# Patient Record
Sex: MALE | Race: BLACK OR AFRICAN AMERICAN | NOT HISPANIC OR LATINO | Employment: UNEMPLOYED | URBAN - METROPOLITAN AREA
[De-identification: names, ages, dates, MRNs, and addresses within clinical notes are randomized per-mention and may not be internally consistent; named-entity substitution may affect disease eponyms.]

---

## 2024-06-16 ENCOUNTER — HOSPITAL ENCOUNTER (EMERGENCY)
Facility: HOSPITAL | Age: 54
Discharge: HOME | End: 2024-06-16
Attending: INTERNAL MEDICINE

## 2024-06-16 VITALS
BODY MASS INDEX: 31.64 KG/M2 | RESPIRATION RATE: 16 BRPM | WEIGHT: 226 LBS | TEMPERATURE: 98 F | HEIGHT: 71 IN | DIASTOLIC BLOOD PRESSURE: 91 MMHG | SYSTOLIC BLOOD PRESSURE: 149 MMHG | OXYGEN SATURATION: 100 % | HEART RATE: 87 BPM

## 2024-06-16 DIAGNOSIS — I10 HYPERTENSION, UNSPECIFIED TYPE: Primary | ICD-10-CM

## 2024-06-16 DIAGNOSIS — R42 LIGHTHEADED: ICD-10-CM

## 2024-06-16 LAB
BILIRUBIN, POC UA: NEGATIVE
BLOOD, POC UA: ABNORMAL
CLARITY, POC UA: ABNORMAL
COLOR, POC UA: ABNORMAL
GLUCOSE, POC UA: NEGATIVE
KETONES, POC UA: ABNORMAL
LEUKOCYTE EST, POC UA: NEGATIVE
NITRITE, POC UA: NEGATIVE
PH UR STRIP: 6.5 [PH]
POCT GLUCOSE: 88 MG/DL (ref 70–110)
PROTEIN, POC UA: NEGATIVE
SPECIFIC GRAVITY, POC UA: 1.02
UROBILINOGEN, POC UA: 2 E.U./DL

## 2024-06-16 PROCEDURE — 82962 GLUCOSE BLOOD TEST: CPT | Mod: ER

## 2024-06-16 PROCEDURE — 93005 ELECTROCARDIOGRAM TRACING: CPT | Mod: ER

## 2024-06-16 PROCEDURE — 25000003 PHARM REV CODE 250: Mod: ER | Performed by: PHYSICIAN ASSISTANT

## 2024-06-16 PROCEDURE — 99283 EMERGENCY DEPT VISIT LOW MDM: CPT | Mod: 25,ER

## 2024-06-16 PROCEDURE — 81003 URINALYSIS AUTO W/O SCOPE: CPT | Mod: ER

## 2024-06-16 RX ORDER — LISINOPRIL AND HYDROCHLOROTHIAZIDE 12.5; 2 MG/1; MG/1
1 TABLET ORAL DAILY
COMMUNITY
End: 2024-06-16

## 2024-06-16 RX ORDER — LISINOPRIL AND HYDROCHLOROTHIAZIDE 12.5; 2 MG/1; MG/1
1 TABLET ORAL DAILY
Qty: 30 TABLET | Refills: 1 | Status: SHIPPED | OUTPATIENT
Start: 2024-06-16 | End: 2024-07-16

## 2024-06-16 RX ORDER — LISINOPRIL 20 MG/1
20 TABLET ORAL
Status: COMPLETED | OUTPATIENT
Start: 2024-06-16 | End: 2024-06-16

## 2024-06-16 RX ORDER — HYDROCHLOROTHIAZIDE 12.5 MG/1
12.5 TABLET ORAL
Status: COMPLETED | OUTPATIENT
Start: 2024-06-16 | End: 2024-06-16

## 2024-06-16 RX ADMIN — LISINOPRIL 20 MG: 20 TABLET ORAL at 06:06

## 2024-06-16 RX ADMIN — HYDROCHLOROTHIAZIDE 12.5 MG: 12.5 TABLET ORAL at 06:06

## 2024-06-16 NOTE — Clinical Note
"Julian Deleonmagdalena Farnsworth was seen and treated in our emergency department on 6/16/2024.  He may return to work on 06/17/2024.       If you have any questions or concerns, please don't hesitate to call.      Yanely Montero PA-C"

## 2024-06-16 NOTE — DISCHARGE INSTRUCTIONS

## 2024-06-16 NOTE — ED NOTES
Felt  weak  yesterday and light headed and  was  unable  to go  to work- reports   feeling  light  headed when  walking  outside

## 2024-06-16 NOTE — Clinical Note
"Julian Deleonmagdalena Farnsworth was seen and treated in our emergency department on 6/16/2024.  He may return to work on 06/18/2024.       If you have any questions or concerns, please don't hesitate to call.      Yanely Montero PA-C"

## 2024-06-17 NOTE — ED PROVIDER NOTES
Encounter Date: 6/16/2024    SCRIBE #1 NOTE: I, Karthik Issa, am scribing for, and in the presence of,  Yanely Montero MD. I have scribed the following portions of the note - Other sections scribed: HPI, ROS.       History     Chief Complaint   Patient presents with    Hypertension      Feeling stressed and  when ambulating  feels    light headed -- denies  dizziness  while  at  rest - -    out of  blood pressure  medication-   denies  syncope-     CC: Hypertensive episode     HPI: 53 yo M with PHMx of HTN, PONV, presents for evaluation of a hypertensive episode beginning PTA. Patient reports associated lightheadedness when ambulating or standing and intermittent blurred vision. Patient notes he is no longer complaint with Rx lisinopril-hctz 20-12.5 because he ran out 3 days ago. He reports his PCP is located in [place], [place], and he is in the area for work. No other exacerbating or alleviating factors.  Denies syncope, dizziness, headaches, numbness, paresthesias, confusion, gait problem, speech difficulty, appetite change, abdominal pain,chest pain, dyspnea, dysuria, urinary frequency, urinary urgency, abnormal bowel movements, nausea, emesis, diarrhea, chills, rash, or any other associated symptoms. Patient additionally reports associated symptoms have revolved.          The history is provided by the patient. No  was used.     Review of patient's allergies indicates:  No Known Allergies  Past Medical History:   Diagnosis Date    Arthritis     right knee    Hiccups     after steroid injections    Hypertension     stopped meds, insurance would not cover    PONV (postoperative nausea and vomiting)     Sinus headache     Smoker      Past Surgical History:   Procedure Laterality Date    FRACTURE SURGERY      zygomatic arch,left side    KNEE ARTHROSCOPY  01/04/2013    right     Family History   Problem Relation Name Age of Onset    Anesthesia problems Neg Hx      Clotting disorder Neg Hx       Diabetes Mother      Hypertension Mother      Early death Father          Gunshot wound     Social History     Tobacco Use    Smoking status: Every Day     Current packs/day: 0.50     Average packs/day: 0.5 packs/day for 1 year (0.5 ttl pk-yrs)     Types: Cigarettes   Substance Use Topics    Alcohol use: Yes     Comment: occasional    Drug use: No     Review of Systems   Constitutional:  Negative for appetite change, chills and fever.   HENT:  Negative for congestion, ear pain, rhinorrhea and sore throat.    Eyes:  Positive for visual disturbance (intermittent blurred vision). Negative for redness.   Respiratory:  Negative for shortness of breath and stridor.    Cardiovascular:  Negative for chest pain.   Gastrointestinal:  Negative for abdominal pain, constipation, diarrhea, nausea and vomiting.        (-) abnormal bowel movements    Genitourinary:  Negative for dysuria, frequency, hematuria and urgency.   Musculoskeletal:  Negative for back pain, gait problem and neck pain.   Skin:  Negative for rash.   Neurological:  Positive for light-headedness. Negative for dizziness, syncope, speech difficulty, weakness, numbness and headaches.        (-) paresthesias    Hematological:  Does not bruise/bleed easily.   Psychiatric/Behavioral:  Negative for confusion.        Physical Exam     Initial Vitals [06/16/24 1740]   BP Pulse Resp Temp SpO2   (!) 141/88 90 16 98.1 °F (36.7 °C) 99 %      MAP       --         Physical Exam    Nursing note and vitals reviewed.  Constitutional: He appears well-developed and well-nourished. No distress.   HENT:   Head: Normocephalic.   Right Ear: External ear normal.   Left Ear: External ear normal.   Eyes: Conjunctivae are normal.   Cardiovascular:  Normal rate and regular rhythm.     Exam reveals no gallop and no friction rub.       No murmur heard.  Pulmonary/Chest: Breath sounds normal. No respiratory distress. He has no wheezes. He has no rhonchi. He has no rales.   Musculoskeletal:          General: Normal range of motion.     Neurological: He is alert. He has normal strength. No cranial nerve deficit or sensory deficit. Coordination and gait normal.   Skin: Skin is warm and dry. No rash noted.   Psychiatric: He has a normal mood and affect. His behavior is normal. Judgment and thought content normal.         ED Course   Procedures  Labs Reviewed   POCT URINALYSIS W/O SCOPE - Abnormal; Notable for the following components:       Result Value    Ketones, UA Trace (*)     Blood, UA Trace-intact (*)     Urobilinogen, UA 2.0 (*)     All other components within normal limits   POCT URINALYSIS W/O SCOPE   POCT GLUCOSE MONITORING CONTINUOUS   POCT GLUCOSE          Imaging Results    None          Medications   lisinopriL tablet 20 mg (20 mg Oral Given 6/16/24 1852)   hydroCHLOROthiazide tablet 12.5 mg (12.5 mg Oral Given 6/16/24 1852)     Medical Decision Making  55 y/o M presenting for evaluation of lightheadedness intermittently for 3 days. Pt states he typically feels this way when he is out of his hypertension medications.  He ran out of his medications 3 days ago.  He denies any chest pain shortness breath dizziness, syncope, weakness, paresthesias, confusion.  Exam above.  No focal neurologic deficits.  Considered but doubt acute intracranial process Glucose normal.  Doubt hypo or hyperglycemia as etiology of his lightheadedness.  UA with trace ketones.  No evidence of UTI.  He was not hypotensive or tachycardic.  Orthostatic vitals unremarkable.  He is asymptomatic currently.  He states that he feels that his symptoms are due to being out of his medications.  Will have him increase oral hydration.  Will discharge with refill of his medications.  Will have him follow up with primary care and return emergency department worsening symptoms or as needed.    Amount and/or Complexity of Data Reviewed  Labs: ordered.    Risk  Prescription drug management.            Scribe Attestation:   Scribe #1: I  performed the above scribed service and the documentation accurately describes the services I performed. I attest to the accuracy of the note.                             I, Yanely Montero PA-C , personally performed the services described in this documentation. All medical record entries made by the scribe were at my direction and in my presence. I have reviewed the chart and agree that the record reflects my personal performance and is accurate and complete.     Clinical Impression:  Final diagnoses:  [R42] Lightheaded  [I10] Hypertension, unspecified type (Primary)          ED Disposition Condition    Discharge Stable          ED Prescriptions       Medication Sig Dispense Start Date End Date Auth. Provider    lisinopriL-hydrochlorothiazide (PRINZIDE,ZESTORETIC) 20-12.5 mg per tablet Take 1 tablet by mouth once daily. 30 tablet 6/16/2024 7/16/2024 Yanely Montero PA-C          Follow-up Information       Follow up With Specialties Details Why Contact Info    [facility] -  Schedule an appointment as soon as possible for a visit in 2 days for follow up [address] [place] [address]  [phone]      [facility] ED Emergency Medicine Go to  As needed, If symptoms worsen [address] [facility] [address] [phone]             Yanely Montero PA-C  06/17/24 5607

## 2024-06-19 LAB
OHS QRS DURATION: 90 MS
OHS QTC CALCULATION: 408 MS

## 2024-07-04 ENCOUNTER — HOSPITAL ENCOUNTER (EMERGENCY)
Facility: HOSPITAL | Age: 54
Discharge: HOME | End: 2024-07-04
Attending: EMERGENCY MEDICINE

## 2024-07-04 VITALS
TEMPERATURE: 98 F | RESPIRATION RATE: 16 BRPM | BODY MASS INDEX: 34.44 KG/M2 | DIASTOLIC BLOOD PRESSURE: 79 MMHG | WEIGHT: 246 LBS | OXYGEN SATURATION: 97 % | HEIGHT: 71 IN | HEART RATE: 84 BPM | SYSTOLIC BLOOD PRESSURE: 123 MMHG

## 2024-07-04 DIAGNOSIS — L24.81 IRRITANT CONTACT DERMATITIS DUE TO METALS: Primary | ICD-10-CM

## 2024-07-04 DIAGNOSIS — L74.0 HEAT RASH: ICD-10-CM

## 2024-07-04 PROCEDURE — 99283 EMERGENCY DEPT VISIT LOW MDM: CPT | Mod: ER

## 2024-07-04 RX ORDER — HYDROCORTISONE 25 MG/G
CREAM TOPICAL 2 TIMES DAILY
Qty: 28 G | Refills: 0 | Status: SHIPPED | OUTPATIENT
Start: 2024-07-04

## 2024-07-04 NOTE — Clinical Note
"Julian Deleonmagdalena Farnsworth was seen and treated in our emergency department on 7/4/2024.  He may return to work on 07/05/2024.       If you have any questions or concerns, please don't hesitate to call.      Viktor Maya PA-C"

## 2024-07-04 NOTE — ED PROVIDER NOTES
"Encounter Date: 7/4/2024    SCRIBE #1 NOTE: I, Nunasreen Hoffmann, am scribing for, and in the presence of,  Viktor Maya PA-C.       History     Chief Complaint   Patient presents with    Rash     Rash  to chest  x 2-3 days   aggravated  by  heat and sweat -  needs  work  note     Julian Farnsworth is a 54 y.o. male, with a PMHx of arthritis, HTN, who presents to the ED with an itchy rash to his chest wall that he noticed yesterday. Patient reports rash is exacerbated with sweat. He also reports a wound to his right posterior lower leg from getting tangled in his neighbor's dog leashes 2 days ago. He reports a lot of dust in the air at work, stating he gets "gunk" in the inner corners of his eyes often. No other exacerbating or alleviating factors. Denies fever, CP, SOB, or other associated symptoms.       The history is provided by the patient. No  was used.     Review of patient's allergies indicates:  No Known Allergies  Past Medical History:   Diagnosis Date    Arthritis     right knee    Hiccups     after steroid injections    Hypertension     stopped meds, insurance would not cover    PONV (postoperative nausea and vomiting)     Sinus headache     Smoker      Past Surgical History:   Procedure Laterality Date    FRACTURE SURGERY      zygomatic arch,left side    KNEE ARTHROSCOPY  01/04/2013    right     Family History   Problem Relation Name Age of Onset    Anesthesia problems Neg Hx      Clotting disorder Neg Hx      Diabetes Mother      Hypertension Mother      Early death Father          Gunshot wound     Social History     Tobacco Use    Smoking status: Every Day     Current packs/day: 0.50     Average packs/day: 0.5 packs/day for 1 year (0.5 ttl pk-yrs)     Types: Cigarettes   Substance Use Topics    Alcohol use: Yes     Comment: occasional    Drug use: No     Review of Systems   Constitutional:  Negative for chills, diaphoresis, fatigue and fever.   HENT:  Negative for congestion, ear " discharge, ear pain, rhinorrhea, sore throat and trouble swallowing.    Eyes:  Negative for photophobia, redness and visual disturbance.   Respiratory:  Negative for cough and shortness of breath.    Cardiovascular:  Negative for chest pain, palpitations and leg swelling.   Gastrointestinal:  Negative for abdominal pain, diarrhea, nausea and vomiting.   Genitourinary:  Negative for difficulty urinating, dysuria, flank pain, frequency and hematuria.   Musculoskeletal:  Negative for arthralgias, back pain, myalgias, neck pain and neck stiffness.   Skin:  Positive for rash (chest) and wound. Negative for pallor.   Neurological:  Negative for dizziness, weakness, light-headedness, numbness and headaches.   Hematological:  Does not bruise/bleed easily.       Physical Exam     Initial Vitals [07/04/24 1653]   BP Pulse Resp Temp SpO2   123/79 84 16 98.2 °F (36.8 °C) 97 %      MAP       --         Physical Exam    Nursing note and vitals reviewed.  Constitutional: He appears well-developed and well-nourished. He is not diaphoretic. He does not appear ill. No distress.   HENT:   Head: Normocephalic and atraumatic.   Right Ear: External ear normal.   Left Ear: External ear normal.   Nose: Nose normal.   Mouth/Throat: Uvula is midline and oropharynx is clear and moist.   Eyes: Conjunctivae, EOM and lids are normal. Pupils are equal, round, and reactive to light. Right eye exhibits no discharge. Left eye exhibits no discharge. No scleral icterus.   Neck: Trachea normal. Neck supple.   Normal range of motion.   Full passive range of motion without pain.     Cardiovascular:  Normal rate, regular rhythm, normal heart sounds, intact distal pulses and normal pulses.     Exam reveals no distant heart sounds and no friction rub.       Pulmonary/Chest: Effort normal and breath sounds normal. No respiratory distress.   Abdominal: Abdomen is soft. Bowel sounds are normal. He exhibits no distension and no pulsatile midline mass. There is  no abdominal tenderness.   No right CVA tenderness.  No left CVA tenderness. There is no rebound and no guarding.   Musculoskeletal:         General: Normal range of motion.      Right shoulder: Normal.      Left shoulder: Normal.      Right elbow: Normal.      Left elbow: Normal.      Right wrist: Normal.      Left wrist: Normal.      Right hand: Normal.      Left hand: Normal.      Cervical back: Normal, full passive range of motion without pain, normal range of motion and neck supple.      Thoracic back: Normal.      Lumbar back: Normal.      Right hip: Normal.      Left hip: Normal.      Right knee: Normal.      Left knee: Normal.      Right lower leg: Normal.      Left lower leg: Normal.      Right ankle: Normal.      Left ankle: Normal.      Right foot: Normal.      Left foot: Normal.     Neurological: He is alert and oriented to person, place, and time. He has normal strength. No cranial nerve deficit or sensory deficit. Coordination and gait normal.   Skin: Skin is warm and dry. Capillary refill takes less than 2 seconds. Rash noted. No bruising and no ecchymosis noted. Rash is maculopapular. No erythema.   Maculopapular scaling rash around neck. Non-tender.  Negative Nikolsky    Approx. 8cm healing abrasion to the right LE with scab formation, no induration, no fluctuance, no drainage. Healing well.   Psychiatric: He has a normal mood and affect. His speech is normal and behavior is normal. Thought content normal.         ED Course   Procedures  Labs Reviewed - No data to display       Imaging Results    None          Medications - No data to display  Medical Decision Making  Julian Farnsworth is a 54 y.o. male, with a PMHx of arthritis, HTN, who presents to the ED with an itchy rash to his chest wall that he noticed yesterday. Patient reports rash is exacerbated with sweat. He also reports a wound to his right posterior lower leg from getting tangled in his neighbor's dog leashes 2 days ago. He reports a  "lot of dust in the air at work, stating he gets "gunk" in the inner corners of his eyes often. No other exacerbating or alleviating factors. Denies fever, CP, SOB, or other associated symptoms.       Patient's chart and medical history reviewed.  Patient's vitals reviewed.  They are afebrile, no respiratory distress, nontoxic-appearing in the ED.  Differential diagnosis considered anaphylaxis, Urticaria, Contact dermatitis, Chicken pox, Hand foot mouth disease, Herpes zoster, Impetigo, Pemphigus vulgaris, Bullous pemphigoid, SJS/TEN, cellulitis, erysipelas, mononucleosis, scarlet fever, drug-induced rash, other rash.   Physical exam as noted above. Patient rash is in the same position as the metal necklace he is wearing today. States he wears the necklace at work and sweats a lot a work. Likely irritant dermatitis 2/2 to metal necklace. Advised to not wear necklace at work when sweating. Will dc with cortisone cream 2.5%.     At this time I'll discharge home to follow up with primary care physician in the next 1-2 days for further evaluation.  If the pain continues the pt will need to see dermatology for further evaluation.  The patient is comfortable with this plan and comfortable going home at this time. After taking into careful account the historical factors and physical exam findings of the patient's presentation today, in conjunction with the empirical and objective data obtained on ED workup, no acute emergent medical condition has been identified. The patient appears to be low risk for an emergent medical condition and I feel it is safe and appropriate at this time for the patient to be discharged to follow-up as detailed in their discharge instructions for reevaluation and possible continued outpatient workup and management. I have discussed the specifics of the workup with the patient and the patient has verbalized understanding of the details of the workup, the diagnosis, the treatment plan, and the need " for outpatient follow-up.  Although the patient has no emergent etiology today this does not preclude the development of an emergent condition so in addition, I have advised the patient that they can return to the ED and/or activate EMS at any time with worsening of their symptoms, change of their symptoms, or with any other medical complaint.  The patient remained comfortable and stable during their visit in the ED.  Discharge and follow-up instructions discussed with the patient who expressed understanding and willingness to comply with my recommendations. I discussed with the patient/family the diagnosis, treatment plan, indications for return to the emergency department, and for expected follow-up. Please follow up with your primary doctor in 1-2 days and return to the ED in any new, worsening, or continued symptoms. The patient/family verbalized an understanding. The patient/family is asked if there are any questions or concerns. We discuss the case, until all issues are addressed to the patient/family's satisfaction. Patient/family understands and is agreeable to the plan.      VIKTOR ANGELO PA-C.    DISCLAIMER: This note was prepared with [employer] voice recognition transcription software. Garbled syntax, mangled pronouns, and other bizarre constructions may be attributed to that software system.      Amount and/or Complexity of Data Reviewed  External Data Reviewed: notes.    Risk  Prescription drug management.            Scribe Attestation:   Scribe #1: I performed the above scribed service and the documentation accurately describes the services I performed. I attest to the accuracy of the note.                             I, Viktor Angelo PA-C, personally performed the services described in this documentation.  All medical record entries made by the scribe were at my direction and in my presence.  I have reviewed the chart and agree that the record reflects my personal performance and is accurate and  complete.    Clinical Impression:  Final diagnoses:  [L74.0] Heat rash  [L24.81] Irritant contact dermatitis due to metals (Primary)          ED Disposition Condition    Discharge Stable          ED Prescriptions       Medication Sig Dispense Start Date End Date Auth. Provider    hydrocortisone 2.5 % cream Apply topically 2 (two) times daily. 28 g 7/4/2024 -- Viktor Maya PA-C          Follow-up Information       Follow up With Specialties Details Why Contact Info    [facility] -  Schedule an appointment as soon as possible for a visit in 1 day for follow up if you do not currently have a  [address]  [place] [address]  [phone]      Your primary care physician  Schedule an appointment as soon as possible for a visit in 1 day for follow up              Viktor Maya PA-C  07/04/24 1730

## 2024-09-27 ENCOUNTER — HOSPITAL ENCOUNTER (EMERGENCY)
Facility: HOSPITAL | Age: 54
Discharge: HOME | End: 2024-09-27
Attending: EMERGENCY MEDICINE
Payer: COMMERCIAL

## 2024-09-27 VITALS
BODY MASS INDEX: 34.88 KG/M2 | HEIGHT: 71 IN | SYSTOLIC BLOOD PRESSURE: 144 MMHG | OXYGEN SATURATION: 99 % | DIASTOLIC BLOOD PRESSURE: 90 MMHG | TEMPERATURE: 99 F | RESPIRATION RATE: 18 BRPM | HEART RATE: 87 BPM | WEIGHT: 249.13 LBS

## 2024-09-27 DIAGNOSIS — M79.672 LEFT FOOT PAIN: ICD-10-CM

## 2024-09-27 DIAGNOSIS — L84 CORN OF FOOT: Primary | ICD-10-CM

## 2024-09-27 DIAGNOSIS — J06.9 VIRAL URI WITH COUGH: ICD-10-CM

## 2024-09-27 LAB
CTP QC/QA: YES
SARS-COV-2 RDRP RESP QL NAA+PROBE: NEGATIVE

## 2024-09-27 PROCEDURE — 87635 SARS-COV-2 COVID-19 AMP PRB: CPT | Mod: ER | Performed by: EMERGENCY MEDICINE

## 2024-09-27 PROCEDURE — 99284 EMERGENCY DEPT VISIT MOD MDM: CPT | Mod: 25,ER

## 2024-09-27 RX ORDER — METHYLPREDNISOLONE 4 MG/1
TABLET ORAL
Qty: 21 EACH | Refills: 0 | Status: SHIPPED | OUTPATIENT
Start: 2024-09-27 | End: 2024-10-18

## 2024-09-27 RX ORDER — GUAIFENESIN AND DEXTROMETHORPHAN HYDROBROMIDE 1200; 60 MG/1; MG/1
1 TABLET, EXTENDED RELEASE ORAL 2 TIMES DAILY PRN
Qty: 14 TABLET | Refills: 0 | Status: SHIPPED | OUTPATIENT
Start: 2024-09-27 | End: 2024-10-04

## 2024-09-27 NOTE — DISCHARGE INSTRUCTIONS
Obtain a over-the-counter corn and callus removal kit.  Follow up with Podiatry.  Recommending obtaining new work boots or placing a new insole into the boot.

## 2024-09-27 NOTE — ED PROVIDER NOTES
Encounter Date: 9/27/2024       History     Chief Complaint   Patient presents with    URI     Patient presents w/ a c/o of URI sx (nasal congestion) since Saturday. Denies any sore throat. Denies any fever.    Foot Pain     Left lateral foot pain for a week. No injury or trauma.     54-year-old male with history of hypertension on lisinopril and hydrochlorothiazide presents complaining of 3 days of nasal congestion and cough fever.  No sore throat.  No ear pain.  No nausea vomiting or diarrhea.  Secondary complaint is several weeks of left foot pain.  No known trauma or injury      Review of patient's allergies indicates:  No Known Allergies  Past Medical History:   Diagnosis Date    Arthritis     right knee    Hiccups     after steroid injections    Hypertension     stopped meds, insurance would not cover    PONV (postoperative nausea and vomiting)     Sinus headache     Smoker      Past Surgical History:   Procedure Laterality Date    FRACTURE SURGERY      zygomatic arch,left side    KNEE ARTHROSCOPY  01/04/2013    right     Family History   Problem Relation Name Age of Onset    Anesthesia problems Neg Hx      Clotting disorder Neg Hx      Diabetes Mother      Hypertension Mother      Early death Father          Gunshot wound     Social History     Tobacco Use    Smoking status: Every Day     Current packs/day: 0.50     Average packs/day: 0.5 packs/day for 1 year (0.5 ttl pk-yrs)     Types: Cigarettes   Substance Use Topics    Alcohol use: Yes     Comment: occasional    Drug use: No     Review of Systems    Physical Exam     Initial Vitals [09/27/24 1059]   BP Pulse Resp Temp SpO2   (!) 144/90 87 18 98.6 °F (37 °C) 99 %      MAP       --         Physical Exam    Nursing note and vitals reviewed.  Constitutional: He appears well-developed and well-nourished.   HENT:   Head: Atraumatic.   Mouth/Throat: Oropharynx is clear and moist.   Eyes: EOM are normal. Pupils are equal, round, and reactive to light.   Neck:  Neck supple. No JVD present.   Normal range of motion.  Cardiovascular:  Normal rate, regular rhythm, normal heart sounds and intact distal pulses.     Exam reveals no gallop and no friction rub.       No murmur heard.  Pulmonary/Chest: Breath sounds normal.   Abdominal: Abdomen is soft. Bowel sounds are normal. There is no abdominal tenderness.   Musculoskeletal:         General: Normal range of motion.      Cervical back: Normal range of motion and neck supple.     Lymphadenopathy:     He has no cervical adenopathy.   Neurological: He is alert and oriented to person, place, and time. He has normal strength.   Skin: Skin is warm and dry.   Patient was developing a corner callus with the area of pain no evidence of infection.  No evidence of foreign body.  Patient was not recall puncture wound.   Psychiatric: He has a normal mood and affect. Thought content normal.         ED Course   Procedures  Labs Reviewed   SARS-COV-2 RDRP GENE       Result Value    POC Rapid COVID Negative       Acceptable Yes            Imaging Results              X-Ray Foot Complete Left (Final result)  Result time 09/27/24 12:13:20      Final result by Tito Gomez MD (09/27/24 12:13:20)                   Impression:      No convincing acute displaced fracture.  Mild soft tissue edema.      Electronically signed by: Tito Gomez MD  Date:    09/27/2024  Time:    12:13               Narrative:    EXAMINATION:  XR FOOT COMPLETE 3 VIEW LEFT    CLINICAL HISTORY:  Pain in left foot    TECHNIQUE:  Three views of the left foot.    COMPARISON:  None.    FINDINGS:  No acute displaced fracture.  No dislocation.  Small plantar calcaneal spur and enthesopathic changes at the Achilles tendon insertion.  Mild soft tissue edema.  No unexpected radiopaque foreign body.                                       Medications - No data to display  Medical Decision Making  Amount and/or Complexity of Data Reviewed  Labs:  ordered.    Risk  OTC drugs.  Prescription drug management.                                      Clinical Impression:  Final diagnoses:  [M79.672] Left foot pain  [L84] Corn of foot (Primary)  [J06.9] Viral URI with cough          ED Disposition Condition    Discharge Stable          ED Prescriptions       Medication Sig Dispense Start Date End Date Auth. Provider    methylPREDNISolone (MEDROL DOSEPACK) 4 mg tablet use as directed 21 each 9/27/2024 10/18/2024 Bridger Krueger MD    dextromethorphan-guaiFENesin (MUCINEX DM) 60-1,200 mg per 12 hr tablet Take 1 tablet by mouth 2 (two) times daily as needed (cough and congestion). 14 tablet 9/27/2024 10/4/2024 Bridger Krueger MD          Follow-up Information       Follow up With Specialties Details Why Contact Info    Mel Lemus DPM Podiatry, Wound Care Schedule an appointment as soon as possible for a visit  for corn/callous. [address] [facility] [id number] [phone]      [facility] ED Emergency Medicine  As needed, If symptoms worsen [address] [facility] [id number] [phone]             Bridger Krueger MD  10/06/24 0618

## 2024-09-27 NOTE — Clinical Note
"Julian"Althea Farnsworth was seen and treated in our emergency department on 9/27/2024.  He may return to work on 09/28/2024.       If you have any questions or concerns, please don't hesitate to call.      Bridger Krueger MD"